# Patient Record
Sex: MALE | Race: BLACK OR AFRICAN AMERICAN | NOT HISPANIC OR LATINO | URBAN - METROPOLITAN AREA
[De-identification: names, ages, dates, MRNs, and addresses within clinical notes are randomized per-mention and may not be internally consistent; named-entity substitution may affect disease eponyms.]

---

## 2018-11-15 ENCOUNTER — EMERGENCY (EMERGENCY)
Facility: HOSPITAL | Age: 28
LOS: 1 days | Discharge: HOME | End: 2018-11-15
Attending: EMERGENCY MEDICINE | Admitting: EMERGENCY MEDICINE
Payer: COMMERCIAL

## 2018-11-15 VITALS
SYSTOLIC BLOOD PRESSURE: 135 MMHG | OXYGEN SATURATION: 100 % | HEART RATE: 76 BPM | DIASTOLIC BLOOD PRESSURE: 77 MMHG | RESPIRATION RATE: 18 BRPM

## 2018-11-15 PROCEDURE — 99284 EMERGENCY DEPT VISIT MOD MDM: CPT | Mod: 25

## 2018-11-15 PROCEDURE — 93010 ELECTROCARDIOGRAM REPORT: CPT

## 2018-11-15 NOTE — ED ADULT TRIAGE NOTE - CHIEF COMPLAINT QUOTE
Pt C/O palpitations x 1.5 hours. Denies CP, ETOH or drug use, PMH/ daily medications. Pt refusing temperature in triage. Appears comfortable and in NAD.

## 2018-11-16 VITALS
TEMPERATURE: 98 F | OXYGEN SATURATION: 100 % | HEART RATE: 75 BPM | SYSTOLIC BLOOD PRESSURE: 136 MMHG | RESPIRATION RATE: 18 BRPM | DIASTOLIC BLOOD PRESSURE: 81 MMHG

## 2018-11-16 PROBLEM — Z00.00 ENCOUNTER FOR PREVENTIVE HEALTH EXAMINATION: Status: ACTIVE | Noted: 2018-11-16

## 2018-11-16 LAB
ALBUMIN SERPL ELPH-MCNC: 4.3 G/DL — SIGNIFICANT CHANGE UP (ref 3.3–5)
ALP SERPL-CCNC: 51 U/L — SIGNIFICANT CHANGE UP (ref 40–120)
ALT FLD-CCNC: 62 U/L — HIGH (ref 4–41)
AST SERPL-CCNC: 36 U/L — SIGNIFICANT CHANGE UP (ref 4–40)
BASOPHILS # BLD AUTO: 0.03 K/UL — SIGNIFICANT CHANGE UP (ref 0–0.2)
BASOPHILS NFR BLD AUTO: 0.5 % — SIGNIFICANT CHANGE UP (ref 0–2)
BILIRUB SERPL-MCNC: 0.4 MG/DL — SIGNIFICANT CHANGE UP (ref 0.2–1.2)
BUN SERPL-MCNC: 10 MG/DL — SIGNIFICANT CHANGE UP (ref 7–23)
CALCIUM SERPL-MCNC: 9.2 MG/DL — SIGNIFICANT CHANGE UP (ref 8.4–10.5)
CHLORIDE SERPL-SCNC: 98 MMOL/L — SIGNIFICANT CHANGE UP (ref 98–107)
CO2 SERPL-SCNC: 27 MMOL/L — SIGNIFICANT CHANGE UP (ref 22–31)
CREAT SERPL-MCNC: 1.01 MG/DL — SIGNIFICANT CHANGE UP (ref 0.5–1.3)
EOSINOPHIL # BLD AUTO: 0.09 K/UL — SIGNIFICANT CHANGE UP (ref 0–0.5)
EOSINOPHIL NFR BLD AUTO: 1.4 % — SIGNIFICANT CHANGE UP (ref 0–6)
GLUCOSE SERPL-MCNC: 91 MG/DL — SIGNIFICANT CHANGE UP (ref 70–99)
HCT VFR BLD CALC: 43.3 % — SIGNIFICANT CHANGE UP (ref 39–50)
HGB BLD-MCNC: 14 G/DL — SIGNIFICANT CHANGE UP (ref 13–17)
IMM GRANULOCYTES # BLD AUTO: 0.01 # — SIGNIFICANT CHANGE UP
IMM GRANULOCYTES NFR BLD AUTO: 0.2 % — SIGNIFICANT CHANGE UP (ref 0–1.5)
LYMPHOCYTES # BLD AUTO: 1.74 K/UL — SIGNIFICANT CHANGE UP (ref 1–3.3)
LYMPHOCYTES # BLD AUTO: 27.8 % — SIGNIFICANT CHANGE UP (ref 13–44)
MAGNESIUM SERPL-MCNC: 1.8 MG/DL — SIGNIFICANT CHANGE UP (ref 1.6–2.6)
MCHC RBC-ENTMCNC: 28.8 PG — SIGNIFICANT CHANGE UP (ref 27–34)
MCHC RBC-ENTMCNC: 32.3 % — SIGNIFICANT CHANGE UP (ref 32–36)
MCV RBC AUTO: 89.1 FL — SIGNIFICANT CHANGE UP (ref 80–100)
MONOCYTES # BLD AUTO: 0.51 K/UL — SIGNIFICANT CHANGE UP (ref 0–0.9)
MONOCYTES NFR BLD AUTO: 8.2 % — SIGNIFICANT CHANGE UP (ref 2–14)
NEUTROPHILS # BLD AUTO: 3.87 K/UL — SIGNIFICANT CHANGE UP (ref 1.8–7.4)
NEUTROPHILS NFR BLD AUTO: 61.9 % — SIGNIFICANT CHANGE UP (ref 43–77)
NRBC # FLD: 0 — SIGNIFICANT CHANGE UP
PHOSPHATE SERPL-MCNC: 2.5 MG/DL — SIGNIFICANT CHANGE UP (ref 2.5–4.5)
PLATELET # BLD AUTO: 321 K/UL — SIGNIFICANT CHANGE UP (ref 150–400)
PMV BLD: 9.5 FL — SIGNIFICANT CHANGE UP (ref 7–13)
POTASSIUM SERPL-MCNC: 3.6 MMOL/L — SIGNIFICANT CHANGE UP (ref 3.5–5.3)
POTASSIUM SERPL-SCNC: 3.6 MMOL/L — SIGNIFICANT CHANGE UP (ref 3.5–5.3)
PROT SERPL-MCNC: 7.6 G/DL — SIGNIFICANT CHANGE UP (ref 6–8.3)
RBC # BLD: 4.86 M/UL — SIGNIFICANT CHANGE UP (ref 4.2–5.8)
RBC # FLD: 14.3 % — SIGNIFICANT CHANGE UP (ref 10.3–14.5)
SODIUM SERPL-SCNC: 137 MMOL/L — SIGNIFICANT CHANGE UP (ref 135–145)
TROPONIN T, HIGH SENSITIVITY: 14 NG/L — SIGNIFICANT CHANGE UP (ref ?–14)
TROPONIN T, HIGH SENSITIVITY: 18 NG/L — SIGNIFICANT CHANGE UP (ref ?–14)
WBC # BLD: 6.25 K/UL — SIGNIFICANT CHANGE UP (ref 3.8–10.5)
WBC # FLD AUTO: 6.25 K/UL — SIGNIFICANT CHANGE UP (ref 3.8–10.5)

## 2018-11-16 PROCEDURE — 71046 X-RAY EXAM CHEST 2 VIEWS: CPT | Mod: 26

## 2018-11-16 NOTE — ED PROVIDER NOTE - ATTENDING CONTRIBUTION TO CARE
HPI: 29 yo M with no known Past Medical History that presents with palpitations, has had these for years but tonight lasted longer than usual. Many years ago started having, lasted seconds and went away, tonight while playing video games, started and lasted approx 1 hour so came for eval. Had holter 2 years ago and told nothing by cards, since never followed up. Denies any recent UTI/URI symptoms, no smoking, drinking, or drugs. Mom with thyroid issues and maternal grandfather born with unspecified arrhythmias. No trauma, no rash, no chest pain, no SOB, no abd pain, no weakness, no chills. Not taking any OTC meds or herbal supps.   EXAM: Well appearing, NAd, heart RRR, no M/R/G/JVD, lungs ctab, abd soft nontender, no edema in BLE.   MDM: Concern for thyroid issues but otherwise unlikely ACS, EKG shows NSR at 78 bpm no ischemic changes, pt placed on monitor. Labs and imaging will be obtained but most likely will dc home to f/u with his private cardiologist if all work up neg. Pt asymptomatic now, took Baby ASA prior to arrival per mom.

## 2018-11-16 NOTE — ED PROVIDER NOTE - MEDICAL DECISION MAKING DETAILS
Artie PGY2: 28M no pmh presents with a cc of sudden onset of palpitations, at rest today, episode lasted approx 2.5 hours, not a/w w/ exertion has had similar episodes over last 2-3x weeks and in few years prior, saw a cardiologist for same years ago, without concern, no chest pain, no sensation to L shoulder or neck exam vss well appearing non toxic exam non focal low c/f acs will get labs tsh cxr ekg reassess Passed

## 2018-11-16 NOTE — ED PROVIDER NOTE - CARE PLAN
Principal Discharge DX:	Palpitations Principal Discharge DX:	Palpitations  Assessment and plan of treatment:	Thank you for visiting our Emergency Department, it has been a pleasure taking part in your healthcare.    Your discharge diagnosis is: palpitations  Please take all discharge medications as indicated below:  Take Motrin/Tylenol for pain as needed, please follow instructions on manufacturers label. If you have any questions please consult a pharmacist or your PMD.  Please continue all medications as rx'd by your PMD.  Please follow up with your PMD within x48 hours.  Please follow up with Cardiology within x48 hours.  A list of providers for follow up has been given to you.  A copy of resulted labs, imaging, and findings have been provided to you.   You have had a detailed discussion with your provider regarding your diagnosis, care management and discharge planning including, but not limited to: return precautions, follow up visits with existing or new providers, new prescriptions and/or medication changes, wound and/or splint/cast care or other care   aspects specific to your diagnosis and treatment. You have been given the opportunity to have your questions answered. At this time you have been deemed stable and fit for discharge.  Return precautions to the Emergency Department include but are not limited to: unrelenting nausea, vomiting, fever, chills, chest pain, shortness of breath, dizziness, chest or abdominal pain, worsening back pain, syncope, blood in urine or stool, headache that doesn't resolve, numbness or tingling, loss of sensation, loss of motor function, or any other concerning symptoms.

## 2018-11-16 NOTE — ED PROVIDER NOTE - PROGRESS NOTE DETAILS
Artie PGY2: Pt assessed at beside. Pt resting comfortably, pain controlled, pt questions answered. Vital signs stable. Discussed trop change >3, discussed r/b/a of further testing, low c/f ACS given no cardiac risk factors no cp, palpitations non exertional; pt and family prefers to delcine additional testing at this time, would rather f/u outpt with cardiologist, pt has cardiologist but will provide list of additional providers. cp free in ED, no palpitations. Will d/c with PMD f/u, strict return precautions given with read back per pt/family/caregiver.

## 2018-11-16 NOTE — ED PROVIDER NOTE - OBJECTIVE STATEMENT
28M no pmh presents with a cc of sudden onset of palpitations, at rest today, episode lasted approx 2.5 hours, not a/w w/ exertion has had similar episodes over last 2-3x weeks and in few years prior, saw a cardiologist for same years ago, without concern, no chest pain, no sensation to L shoulder or neck Denies numbness, tingling or loss of sensation. Fm Hx of arrythmia and thyroid dx. recent travel to [place].  Denies loss of motor function. No vision changes. Denies n/v/f/c/cp/sob. Denies headache, syncope, lightheadedness, dizziness. Denies abdominal pain. Denies dysuria, hematuria, hematochezia, BRBPR, tarry stools, diarrhea, constipation.

## 2018-11-16 NOTE — ED PROVIDER NOTE - PLAN OF CARE
Thank you for visiting our Emergency Department, it has been a pleasure taking part in your healthcare.    Your discharge diagnosis is: palpitations  Please take all discharge medications as indicated below:  Take Motrin/Tylenol for pain as needed, please follow instructions on manufacturers label. If you have any questions please consult a pharmacist or your PMD.  Please continue all medications as rx'd by your PMD.  Please follow up with your PMD within x48 hours.  Please follow up with Cardiology within x48 hours.  A list of providers for follow up has been given to you.  A copy of resulted labs, imaging, and findings have been provided to you.   You have had a detailed discussion with your provider regarding your diagnosis, care management and discharge planning including, but not limited to: return precautions, follow up visits with existing or new providers, new prescriptions and/or medication changes, wound and/or splint/cast care or other care   aspects specific to your diagnosis and treatment. You have been given the opportunity to have your questions answered. At this time you have been deemed stable and fit for discharge.  Return precautions to the Emergency Department include but are not limited to: unrelenting nausea, vomiting, fever, chills, chest pain, shortness of breath, dizziness, chest or abdominal pain, worsening back pain, syncope, blood in urine or stool, headache that doesn't resolve, numbness or tingling, loss of sensation, loss of motor function, or any other concerning symptoms.

## 2018-11-16 NOTE — ED PROVIDER NOTE - NSFOLLOWUPCLINICS_GEN_ALL_ED_FT
[facility]  Cardiology  [address] [place] [address]  Phone: [phone]  Fax:   Follow Up Time:

## 2018-11-16 NOTE — ED ADULT NURSE NOTE - OBJECTIVE STATEMENT
Ambulatory [facility] officer accompanied by mother stating that he was at rest and he all the sudden started feeling palpitations, "like a flutter in his heart." NAD, calm and cooperative, a/ox4 patient with no PMH/PSH. Mother has thyroid issues and maternal grandfather had arrythmia. Providers at bedside. Continuous cardiac monitoring in place. Labs drawn and sent, safety maintained, needs attended, will continue to monitor.

## 2019-06-06 ENCOUNTER — RESULT REVIEW (OUTPATIENT)
Age: 29
End: 2019-06-06

## 2019-06-06 ENCOUNTER — OUTPATIENT (OUTPATIENT)
Dept: OUTPATIENT SERVICES | Facility: HOSPITAL | Age: 29
LOS: 1 days | Discharge: HOME | End: 2019-06-06
Payer: COMMERCIAL

## 2019-06-06 DIAGNOSIS — K62.89 OTHER SPECIFIED DISEASES OF ANUS AND RECTUM: ICD-10-CM

## 2019-06-06 DIAGNOSIS — R63.0 ANOREXIA: ICD-10-CM

## 2019-06-06 PROCEDURE — 88305 TISSUE EXAM BY PATHOLOGIST: CPT | Mod: 26

## 2019-06-06 PROCEDURE — 88312 SPECIAL STAINS GROUP 1: CPT | Mod: 26

## 2019-06-07 LAB — SURGICAL PATHOLOGY STUDY: SIGNIFICANT CHANGE UP

## 2019-07-29 ENCOUNTER — APPOINTMENT (OUTPATIENT)
Age: 29
End: 2019-07-29
Payer: COMMERCIAL

## 2019-07-29 VITALS
TEMPERATURE: 98.8 F | BODY MASS INDEX: 25.93 KG/M2 | HEART RATE: 95 BPM | OXYGEN SATURATION: 97 % | WEIGHT: 202 LBS | HEIGHT: 74 IN | SYSTOLIC BLOOD PRESSURE: 132 MMHG | RESPIRATION RATE: 16 BRPM | DIASTOLIC BLOOD PRESSURE: 75 MMHG

## 2019-07-29 DIAGNOSIS — K64.9 UNSPECIFIED HEMORRHOIDS: ICD-10-CM

## 2019-07-29 DIAGNOSIS — R63.4 ABNORMAL WEIGHT LOSS: ICD-10-CM

## 2019-07-29 DIAGNOSIS — Z87.19 PERSONAL HISTORY OF OTHER DISEASES OF THE DIGESTIVE SYSTEM: ICD-10-CM

## 2019-07-29 PROCEDURE — 99245 OFF/OP CONSLTJ NEW/EST HI 55: CPT

## 2019-07-29 NOTE — REVIEW OF SYSTEMS
[Recent Weight Loss (___ Lbs)] : recent [unfilled] ~Ulb weight loss [Heartburn] : heartburn [Negative] : Psychiatric [Fever] : no fever [Chills] : no chills [Feeling Poorly] : not feeling poorly [Recent Weight Gain (___ Lbs)] : no recent weight gain [Feeling Tired] : not feeling tired [Vomiting] : no vomiting [Constipation] : no constipation [Abdominal Pain] : no abdominal pain [Diarrhea] : no diarrhea [Melena] : no melena

## 2019-07-29 NOTE — CONSULT LETTER
[Dear  ___] : Dear  [unfilled], [Consult Letter:] : I had the pleasure of evaluating your patient, [unfilled]. [Please see my note below.] : Please see my note below. [Sincerely,] : Sincerely, [FreeTextEntry3] : \par \par \par Edgardo Pugh MD\par Department of Cardiovascular and Thoracic Surgery\par  \par [facility]  [FreeTextEntry2] : Dr. Lexy Covington (PCP)\par Dr. James (GI)

## 2019-07-29 NOTE — ASSESSMENT
[FreeTextEntry1] : Mr. GWENDOLYN COLE is a 28 year old male presenting for evaluation for Achalasia.  He was referred by his PCP Dr. Lexy Covington.  \par \par He reports history of intermittent dysphagia and stated that he was sick with the flu in December 2018, but noticed decreased appetite persisted causing approximately 15 lb weight loss.  Endoscopy was performed by Dr. James on 6/6/19.   Endoscopy revealed normal examined duodenum, biopsy taken.  Diffuse moderate inflammation characterized by erythema was found in the gastric antrum, biopsy was taken.  The Z-line was regular and was found 42 cm from the incisors.  Food was found in the middle third of the esophagus and in the lower third of the esophagus.  Biopsies were taken with a cold forceps for histology.  Ge junction appeared normal with no resistance to the EGD scope. The lumen of the middle third of the esophagus and lower third of the esophagus was severely dilated from 27 to 42 cms.  Pathology of the duodenum biopsy revealed duodenal mucosa with preserved villous architecture and no significant intraepithelial lymphocytosis.  Pathology of stomach, antrum biopsy revealed gastric antral type mucosa with chronic nonspecific gastritis and focal reactive gastropathy.  Negative for H. pylori.  Negative for intestinal metaplasia.  Pathology of esophageal biopsy revealed esophageal squamous epithelium without significant pathologic findings.  No evidence of intraepithelial eosinophil infiltration.  No columnar epithelium present. \par \par I have reviewed the patient's medical records and diagnostic images during the time of this office visit, and I have made the following recommendation:\par 1.  I have recommended that he have a Barium Esophagram done.\par 2.  I have referred him to see Dr. Vicky Roca for esophageal manometry study.  \par 3.  I would like him to follow up after above studies are done to discuss the results.\par \par Written by Carly Beltre NP, acting as a scribe for Kai Botello MD.\par \par The documentation recorded by the scribe accurately reflects the service I personally performed and the decisions made by me. KAI BOTELLO MD\par

## 2019-07-29 NOTE — PHYSICAL EXAM
[General Appearance - In No Acute Distress] : in no acute distress [General Appearance - Alert] : alert [General Appearance - Well Nourished] : well nourished [Sclera] : the sclera and conjunctiva were normal [General Appearance - Well Developed] : well developed [Outer Ear] : the ears and nose were normal in appearance [Hearing Threshold Finger Rub Not Parmer] : hearing was normal [] : the neck was supple [Neck Appearance] : the appearance of the neck was normal [Neck Cervical Mass (___cm)] : no neck mass was observed [Auscultation Breath Sounds / Voice Sounds] : lungs were clear to auscultation bilaterally [Respiration, Rhythm And Depth] : normal respiratory rhythm and effort [Heart Sounds] : normal S1 and S2 [Examination Of The Chest] : the chest was normal in appearance [Heart Rate And Rhythm] : heart rate was normal and rhythm regular [Abdomen Tenderness] : non-tender [Abdomen Soft] : soft [Cervical Lymph Nodes Enlarged Posterior Bilaterally] : posterior cervical [Cervical Lymph Nodes Enlarged Anterior Bilaterally] : anterior cervical [No CVA Tenderness] : no ~M costovertebral angle tenderness [Supraclavicular Lymph Nodes Enlarged Bilaterally] : supraclavicular [Abnormal Walk] : normal gait [Skin Color & Pigmentation] : normal skin color and pigmentation [No Focal Deficits] : no focal deficits [Impaired Insight] : insight and judgment were intact [Oriented To Time, Place, And Person] : oriented to person, place, and time [Affect] : the affect was normal [FreeTextEntry1] : deferred

## 2019-07-29 NOTE — HISTORY OF PRESENT ILLNESS
[FreeTextEntry1] : Mr. GWENDOLYN COLE is a 28 year old male presenting for evaluation for Achalasia.  He was referred by his PCP Dr. Lexy Covington.  \par \par He reports history of intermittent dysphagia and stated that he was sick with the flu in December 2018, but noticed decreased appetite persisted causing approximately 15 lb weight loss.  Endoscopy was performed by Dr. James on 6/6/19.   Endoscopy revealed normal examined duodenum, biopsy taken.  Diffuse moderate inflammation characterized by erythema was found in the gastric antrum, biopsy was taken.  The Z-line was regular and was found 42 cm from the incisors.  Food was found in the middle third of the esophagus and in the lower third of the esophagus.  Biopsies were taken with a cold forceps for histology.  Ge junction appeared normal with no resistance to the EGD scope. The lumen of the middle third of the esophagus and lower third of the esophagus was severely dilated from 27 to 42 cms.  Pathology of the duodenum biopsy revealed duodenal mucosa with preserved villous architecture and no significant intraepithelial lymphocytosis.  Pathology of stomach, antrum biopsy revealed gastric antral type mucosa with chronic nonspecific gastritis and focal reactive gastropathy.  Negative for H. pylori.  Negative for intestinal metaplasia.  Pathology of esophageal biopsy revealed esophageal squamous epithelium without significant pathologic findings.  No evidence of intraepithelial eosinophil infiltration.  No columnar epithelium present.   He was recommended to have esophageal manometry, but has not had this performed yet.  \par \par He complains of intermittent dysphagia, decreased appetite and occasional heartburn since December 2018.  He denies any choking episodes, abdominal pain, nausea, vomiting or change in bowel habits.

## 2019-07-31 ENCOUNTER — OTHER (OUTPATIENT)
Age: 29
End: 2019-07-31

## 2019-08-11 ENCOUNTER — FORM ENCOUNTER (OUTPATIENT)
Age: 29
End: 2019-08-11

## 2019-08-12 ENCOUNTER — APPOINTMENT (OUTPATIENT)
Dept: RADIOLOGY | Facility: HOSPITAL | Age: 29
End: 2019-08-12
Payer: COMMERCIAL

## 2019-08-12 ENCOUNTER — OUTPATIENT (OUTPATIENT)
Dept: OUTPATIENT SERVICES | Facility: HOSPITAL | Age: 29
LOS: 1 days | End: 2019-08-12

## 2019-08-12 DIAGNOSIS — K22.0 ACHALASIA OF CARDIA: ICD-10-CM

## 2019-08-12 DIAGNOSIS — R63.0 ANOREXIA: ICD-10-CM

## 2019-08-12 PROCEDURE — 74220 X-RAY XM ESOPHAGUS 1CNTRST: CPT | Mod: 26

## 2019-09-25 ENCOUNTER — APPOINTMENT (OUTPATIENT)
Dept: GASTROENTEROLOGY | Facility: HOSPITAL | Age: 29
End: 2019-09-25

## 2019-09-25 ENCOUNTER — OUTPATIENT (OUTPATIENT)
Dept: OUTPATIENT SERVICES | Facility: HOSPITAL | Age: 29
LOS: 1 days | Discharge: HOME | End: 2019-09-25
Payer: COMMERCIAL

## 2019-09-25 DIAGNOSIS — R13.10 DYSPHAGIA, UNSPECIFIED: ICD-10-CM

## 2019-09-25 DIAGNOSIS — K22.0 ACHALASIA OF CARDIA: ICD-10-CM

## 2019-09-25 PROCEDURE — 91010 ESOPHAGUS MOTILITY STUDY: CPT | Mod: 26,GC

## 2019-09-25 PROCEDURE — 91037 ESOPH IMPED FUNCTION TEST: CPT | Mod: 26,GC

## 2019-10-04 ENCOUNTER — APPOINTMENT (OUTPATIENT)
Dept: GASTROENTEROLOGY | Facility: HOSPITAL | Age: 29
End: 2019-10-04

## 2019-10-07 ENCOUNTER — APPOINTMENT (OUTPATIENT)
Dept: THORACIC SURGERY | Facility: CLINIC | Age: 29
End: 2019-10-07
Payer: COMMERCIAL

## 2019-10-18 PROBLEM — R13.10 DYSPHAGIA: Status: ACTIVE | Noted: 2019-07-29

## 2019-10-21 ENCOUNTER — APPOINTMENT (OUTPATIENT)
Dept: THORACIC SURGERY | Facility: CLINIC | Age: 29
End: 2019-10-21
Payer: COMMERCIAL

## 2019-10-21 VITALS
BODY MASS INDEX: 26.58 KG/M2 | OXYGEN SATURATION: 99 % | DIASTOLIC BLOOD PRESSURE: 79 MMHG | WEIGHT: 207 LBS | SYSTOLIC BLOOD PRESSURE: 130 MMHG | HEART RATE: 79 BPM | RESPIRATION RATE: 16 BRPM

## 2019-10-21 DIAGNOSIS — R13.10 DYSPHAGIA, UNSPECIFIED: ICD-10-CM

## 2019-10-21 PROCEDURE — 99213 OFFICE O/P EST LOW 20 MIN: CPT

## 2019-10-21 NOTE — HISTORY OF PRESENT ILLNESS
[FreeTextEntry1] : GWENDOLYN COLE is a 28 year old M presenting for a follow up visit for Achalasia. He reported he was sick with the flu in December 2018, but noticed decreased appetite persisted causing approximately 15 lb weight loss and intermittent dysphagia. Endoscopy was performed by Dr. James on 6/6/19.  \par \par Endoscopy was done on 6/6/2019 by Dr. James which revealed diffuse moderate inflammation characterized by erythema was found in the gastric antrum. Food was found in the middle third of the esophagus and in the lower third of the esophagus. The lumen of the middle third of the esophagus and lower third of the esophagus was severely dilated from 27 to 42 cms. Pathology of the duodenum biopsy revealed duodenal mucosa with preserved villous architecture and no significant intraepithelial lymphocytosis. Pathology of stomach, antrum biopsy revealed gastric antral type mucosa with chronic nonspecific gastritis and focal reactive gastropathy. Negative for H. pylori. Negative for intestinal metaplasia. Pathology of esophageal biopsy revealed esophageal squamous epithelium without significant pathologic findings. No evidence of intraepithelial eosinophil infiltration. No columnar epithelium present.\par \par On previous visit 7/29/2019, he was asked to complete Barium Swallow and esophageal manometry study with Dr. Vicky Roca. \par \par Barium Swallow 8/12/19: The patient swallowed a 13 mm barium tablet without difficulty however there was delay in passage through the GE junction. The patient swallowed barium without difficulty. Esophagus was distended and markedly dilated with a beaklike termination at the level of the GE junction. There is delay of oral contrast passing into the stomach. No hiatal hernia. Mild gastroesophageal reflux. \par \par Manometry 9/25/19: Catheter unable to cross LES despite numerous attempts. 100% of swallows demonstrate absent peristalsis. 100% of swallows demonstrate incomplete bolus clearance by impedance analysis. \par \par Pt presents today for follow up. The patient reports decreased dysphagia now that he chews his food well. The patient denies shortness of breath, nausea, vomiting, reflux. He reports normal appetite and bowel movements. \par \par    \par

## 2019-10-21 NOTE — ASSESSMENT
[FreeTextEntry1] : 28 year old M presenting for a follow up visit for Achalasia. He reported he was sick with the flu in December 2018, but noticed decreased appetite persisted causing approximately 15 lb weight loss and intermittent dysphagia. Endoscopy was performed by Dr. James on 6/6/19.  \par \par Endoscopy was done on 6/6/2019 by Dr. James which revealed diffuse moderate inflammation characterized by erythema was found in the gastric antrum. Food was found in the middle third of the esophagus and in the lower third of the esophagus. The lumen of the middle third of the esophagus and lower third of the esophagus was severely dilated from 27 to 42 cms. Pathology of the duodenum biopsy revealed duodenal mucosa with preserved villous architecture and no significant intraepithelial lymphocytosis. Pathology of stomach, antrum biopsy revealed gastric antral type mucosa with chronic nonspecific gastritis and focal reactive gastropathy. Negative for H. pylori. Negative for intestinal metaplasia. Pathology of esophageal biopsy revealed esophageal squamous epithelium without significant pathologic findings. No evidence of intraepithelial eosinophil infiltration. No columnar epithelium present.\par \par On previous visit 7/29/2019, he was asked to complete Barium Swallow and esophageal manometry study with Dr. Vicky Roca. \par \par Barium Swallow 8/12/19: The patient swallowed a 13 mm barium tablet without difficulty however there was delay in passage through the GE junction. The patient swallowed barium without difficulty. Esophagus was distended and markedly dilated with a beaklike termination at the level of the GE junction. There is delay of oral contrast passing into the stomach. No hiatal hernia. Mild gastroesophageal reflux. \par \par Manometry 9/25/19: Catheter unable to cross LES despite numerous attempts. 100% of swallows demonstrate absent peristalsis. 100% of swallows demonstrate incomplete bolus clearance by impedance analysis. \par \par I have reviewed the medical records and images with the patient and made the following recommendations. I have recommended surgical myotomy. The patient is reluctant to undergo surgery at this point and would like to consider other options. We discussed all options at length. His esophagus is markedly dilated with very little contraction. I discussed with the patient in detail the risk of prolonging treatment. We discussed POEM procedure for possible short term intervention. He would like to wait till the new year to discuss options. He will RTO in January/February. We will assess his symptoms and discuss treatment plan. \par \par Written by Nirali Jalloh NP, acting as a scribe for Edgardo Pugh MD.\par The documentation recorded by the scribe accurately reflects the service I personally performed and the decisions made by me. Edgardo Pugh MD\par  \par

## 2019-10-21 NOTE — CONSULT LETTER
[Dear  ___] : Dear  [unfilled], [Consult Letter:] : I had the pleasure of evaluating your patient, [unfilled]. [( Thank you for referring [unfilled] for consultation for _____ )] : Thank you for referring [unfilled] for consultation for [unfilled] [FreeTextEntry2] : Dr. Lexy Covington (PCP)\par Dr. James (GI) \par  [FreeTextEntry3] : Edgardo Pugh MD\par Department of Cardiovascular and Thoracic Surgery\par  \par [facility]\par

## 2019-10-21 NOTE — DATA REVIEWED
[FreeTextEntry1] : Esophageal motility study was done on 9/25/2019 which showed Aperistalsis of the esophagus. Despite numerous attempts catheter could not cross EGJ. Recommended EGD with EndoFLIP with complete assessment of LES to confirm achalasia. \par \par Barium swallow done on 6/20/2019 showed esophagus was distended and markedly dilated with a beaklike termination of Level of GE junction. There is a delay of oral contrast passing into the stomach and mild dysphagia. Achalasia.

## 2019-10-21 NOTE — PHYSICAL EXAM
[Sclera] : the sclera and conjunctiva were normal [Extraocular Movements] : extraocular movements were intact [Neck Appearance] : the appearance of the neck was normal [Neck Cervical Mass (___cm)] : no neck mass was observed [Jugular Venous Distention Increased] : there was no jugular-venous distention [] : no respiratory distress [Respiration, Rhythm And Depth] : normal respiratory rhythm and effort [Auscultation Breath Sounds / Voice Sounds] : lungs were clear to auscultation bilaterally [Exaggerated Use Of Accessory Muscles For Inspiration] : no accessory muscle use [Heart Rate And Rhythm] : heart rate was normal and rhythm regular [Diminished Respiratory Excursion] : normal chest expansion [Bowel Sounds] : normal bowel sounds [Abdomen Soft] : soft [Abdomen Tenderness] : non-tender [Cervical Lymph Nodes Enlarged Posterior Bilaterally] : posterior cervical [Cervical Lymph Nodes Enlarged Anterior Bilaterally] : anterior cervical [Involuntary Movements] : no involuntary movements were seen [Supraclavicular Lymph Nodes Enlarged Bilaterally] : supraclavicular [Skin Color & Pigmentation] : normal skin color and pigmentation [No Focal Deficits] : no focal deficits [Oriented To Time, Place, And Person] : oriented to person, place, and time [Impaired Insight] : insight and judgment were intact [Affect] : the affect was normal [Mood] : the mood was normal

## 2020-03-10 ENCOUNTER — APPOINTMENT (OUTPATIENT)
Dept: THORACIC SURGERY | Facility: CLINIC | Age: 30
End: 2020-03-10

## 2020-03-10 DIAGNOSIS — R63.0 ANOREXIA: ICD-10-CM

## 2020-06-22 PROBLEM — R63.0 LOSS OF APPETITE: Status: ACTIVE | Noted: 2019-07-29

## 2020-06-22 PROBLEM — K22.0 ACHALASIA: Status: ACTIVE | Noted: 2019-07-29

## 2020-06-23 ENCOUNTER — APPOINTMENT (OUTPATIENT)
Dept: THORACIC SURGERY | Facility: CLINIC | Age: 30
End: 2020-06-23
Payer: COMMERCIAL

## 2020-06-23 DIAGNOSIS — K22.0 ACHALASIA OF CARDIA: ICD-10-CM

## 2020-06-23 PROCEDURE — 99203 OFFICE O/P NEW LOW 30 MIN: CPT | Mod: 95

## 2020-06-23 NOTE — CONSULT LETTER
[Consult Letter:] : I had the pleasure of evaluating your patient, [unfilled]. [( Thank you for referring [unfilled] for consultation for _____ )] : Thank you for referring [unfilled] for consultation for [unfilled] [Consult Closing:] : Thank you very much for allowing me to participate in the care of this patient.  If you have any questions, please do not hesitate to contact me. [FreeTextEntry3] : Edgardo Pugh MD\par Director of Thoracic, [facility]\par Cardiovascular & Thoracic Surgery\par Assitant Professor\par Cardiovascular & Thoracic Surgery\par [facility] [employer]\par \par [facility]\par [address]\par Oncology Building 3rd Fl\[place] [address]\par Tel: [phone]\par Fax: [phone] [FreeTextEntry2] : Lexy Rosario MD\par Reggie James MD (GI)\par Vicky Roca MD (GI Motility)

## 2020-06-23 NOTE — HISTORY OF PRESENT ILLNESS
[Home] : at home, [unfilled] , at the time of the visit. [Medical Office: ([facility])___] : at the medical office located in  [Verbal consent obtained from patient] : the patient, [unfilled] [FreeTextEntry1] : 29 year old M, w/ hx of Achalasia. \par \par Endoscopy on 6/6/2019 by Dr. James which revealed diffuse moderate inflammation characterized by erythema was found in the gastric antrum. Food was found in the middle third of the esophagus and in the lower third of the esophagus. The lumen of the middle third of the esophagus and lower third of the esophagus was severely dilated from 27 to 42 cms. Pathology of the duodenum biopsy revealed duodenal mucosa with preserved villous architecture and no significant intraepithelial lymphocytosis. Pathology of stomach, antrum biopsy revealed gastric antral type mucosa with chronic nonspecific gastritis and focal reactive gastropathy. Negative for H. pylori. Negative for intestinal metaplasia. Pathology of esophageal biopsy revealed esophageal squamous epithelium without significant pathologic findings. No evidence of intraepithelial eosinophil infiltration. No columnar epithelium present.\par \par Barium Swallow 8/12/19: The patient swallowed a 13 mm barium tablet without difficulty however there was delay in passage through the GE junction. The patient swallowed barium without difficulty. Esophagus was distended and markedly dilated with a beaklike termination at the level of the GE junction. There is delay of oral contrast passing into the stomach. No hiatal hernia. Mild gastroesophageal reflux. \par \par Manometry 9/25/19 by Dr. Vicky Roca: Catheter unable to cross LES despite numerous attempts. 100% of swallows demonstrate absent peristalsis. 100% of swallows demonstrate incomplete bolus clearance by impedance analysis. \par \par Patient is followed today via Telehealth to discuss surgical intervention VS POEM procedure. Admits to food regurgitation.

## 2020-06-23 NOTE — ASSESSMENT
[FreeTextEntry1] : 29 year old M, w/ hx of Achalasia. \par \par Endoscopy on 6/6/2019 by Dr. James which revealed diffuse moderate inflammation characterized by erythema was found in the gastric antrum. Food was found in the middle third of the esophagus and in the lower third of the esophagus. The lumen of the middle third of the esophagus and lower third of the esophagus was severely dilated from 27 to 42 cms. Pathology of the duodenum biopsy revealed duodenal mucosa with preserved villous architecture and no significant intraepithelial lymphocytosis. Pathology of stomach, antrum biopsy revealed gastric antral type mucosa with chronic nonspecific gastritis and focal reactive gastropathy. Negative for H. pylori. Negative for intestinal metaplasia. Pathology of esophageal biopsy revealed esophageal squamous epithelium without significant pathologic findings. No evidence of intraepithelial eosinophil infiltration. No columnar epithelium present.\par \par Barium Swallow 8/12/19: The patient swallowed a 13 mm barium tablet without difficulty however there was delay in passage through the GE junction. The patient swallowed barium without difficulty. Esophagus was distended and markedly dilated with a beaklike termination at the level of the GE junction. There is delay of oral contrast passing into the stomach. No hiatal hernia. Mild gastroesophageal reflux. \par \par Manometry 9/25/19 [address] Dr. Vicky Roca: Catheter unable to cross LES despite numerous attempts. 100% of swallows demonstrate absent peristalsis. 100% of swallows demonstrate incomplete bolus clearance by impedance analysis. \par \par I have reviewed the patient's medical records and diagnostic images at time of this office consultation and have made the following recommendation:\par 1. Again discussed surgical intervention, which is highly recommended, but patient would like to consult with Dr. Vicky Roca for another study.\par 2. I recommended patient to RTC after discussing with Dr. Roca.\par \par \par I personally performed the services described in the documentation, reviewed the documentation recorded by the scribe in my presence and it accurately and completely records my words and actions.\par \par I, Brianda Sood NP, am scribing for and the presence of KAI Connell, the following sections HISTORY OF PRESENT ILLNESS, PAST MEDICAL/FAMILY/SOCIAL HISTORY; REVIEW OF SYSTEMS; VITAL SIGNS; PHYSICAL EXAM; DISPOSITION.\par \par